# Patient Record
Sex: MALE | URBAN - METROPOLITAN AREA
[De-identification: names, ages, dates, MRNs, and addresses within clinical notes are randomized per-mention and may not be internally consistent; named-entity substitution may affect disease eponyms.]

---

## 2023-01-31 ENCOUNTER — EMERGENCY (EMERGENCY)
Facility: HOSPITAL | Age: 33
LOS: 1 days | Discharge: HOME | End: 2023-01-31
Attending: EMERGENCY MEDICINE | Admitting: EMERGENCY MEDICINE
Payer: SELF-PAY

## 2023-01-31 VITALS
SYSTOLIC BLOOD PRESSURE: 123 MMHG | HEART RATE: 108 BPM | DIASTOLIC BLOOD PRESSURE: 82 MMHG | OXYGEN SATURATION: 99 % | WEIGHT: 169.09 LBS | TEMPERATURE: 98 F | RESPIRATION RATE: 19 BRPM

## 2023-01-31 VITALS — HEART RATE: 95 BPM

## 2023-01-31 PROCEDURE — 99284 EMERGENCY DEPT VISIT MOD MDM: CPT

## 2023-01-31 NOTE — ED PROVIDER NOTE - PATIENT PORTAL LINK FT
You can access the FollowMyHealth Patient Portal offered by [facility] by registering at the following website: [email or web address]. By joining [facility]’s FollowMyHealth portal, you will also be able to view your health information using other applications (apps) compatible with our system.

## 2023-01-31 NOTE — ED ADULT TRIAGE NOTE - CHIEF COMPLAINT QUOTE
Pt presents to ed reporting lower abd pain x 2 days. pt also reports that in past two days he's noticed "more sediment" when he wipes. he also reports increased appetite and weight loss in past month.

## 2023-01-31 NOTE — ED PROVIDER NOTE - CLINICAL SUMMARY MEDICAL DECISION MAKING FREE TEXT BOX
32-year-old male presents emergency department.  No recent travel and abdomen soft nontender nondistended making acute abdominal pathology unlikely patient also has rectum.  Rectal exam DC home strict return precautions.

## 2023-01-31 NOTE — ED PROVIDER NOTE - PHYSICAL EXAMINATION
Const: NAD  Eyes: PERRL, no conjunctival injection  HENT:  Neck supple without meningismus   CV: RRR, Warm, well-perfused extremities  RESP: CTA B/L, no tachypnea   GI: soft, non-tender, non-distended  Rectal: (exam done with PCT Glendy) rectal exam normal. no fissures or hemorrhoids. no blood or black stool. No discharge.   MSK: No gross deformities appreciated  Skin: Warm, dry. No rashes  Neuro: Alert, CNs II-XII grossly intact. Sensation and motor function of extremities grossly intact.  Psych: Appropriate mood and affect.

## 2023-01-31 NOTE — ED PROVIDER NOTE - OBJECTIVE STATEMENT
31 yo M with PMH of HIV (on HAART) medications presents to ED for 2 concerns  1. Sensation that sediment is in his rectum. No diarrhea or constipation. No rectal pain. No discharge. No testicular pain. No urinary symptoms. Pt was recently tested for STI and does not want testing at this time.   2. Pt has been eating more. Pt's  states that pt has been eating more and was concerned he might have worms. No weight loss. No current abdominal pain. No recent travel. No fevers or chills.

## 2023-02-02 DIAGNOSIS — Z13.9 ENCOUNTER FOR SCREENING, UNSPECIFIED: ICD-10-CM

## 2023-02-02 DIAGNOSIS — B20 HUMAN IMMUNODEFICIENCY VIRUS [HIV] DISEASE: ICD-10-CM
